# Patient Record
Sex: MALE | Race: OTHER | Employment: UNEMPLOYED | URBAN - METROPOLITAN AREA
[De-identification: names, ages, dates, MRNs, and addresses within clinical notes are randomized per-mention and may not be internally consistent; named-entity substitution may affect disease eponyms.]

---

## 2023-09-18 ENCOUNTER — HOSPITAL ENCOUNTER (EMERGENCY)
Age: 38
Discharge: HOME | End: 2023-09-18
Payer: MEDICAID

## 2023-09-18 VITALS
TEMPERATURE: 98.1 F | SYSTOLIC BLOOD PRESSURE: 116 MMHG | HEART RATE: 76 BPM | DIASTOLIC BLOOD PRESSURE: 54 MMHG | RESPIRATION RATE: 16 BRPM | OXYGEN SATURATION: 98 %

## 2023-09-18 DIAGNOSIS — M54.50 LOW BACK PAIN, UNSPECIFIED BACK PAIN LATERALITY, UNSPECIFIED CHRONICITY, UNSPECIFIED WHETHER SCIATICA PRESENT: Primary | ICD-10-CM

## 2023-09-18 DIAGNOSIS — R10.30 LOWER ABDOMINAL PAIN: ICD-10-CM

## 2023-09-18 LAB
BACTERIA: NEGATIVE /HPF
BILIRUBIN URINE: NEGATIVE MG/DL
BLOOD, URINE: NEGATIVE
CLARITY: CLEAR
COLOR: YELLOW
GLUCOSE, URINE: NEGATIVE MG/DL
INFLUENZA A ANTIGEN: NOT DETECTED
INFLUENZA B ANTIGEN: NOT DETECTED
KETONES, URINE: NEGATIVE MG/DL
LEUKOCYTE ESTERASE, URINE: ABNORMAL
MUCUS: ABNORMAL HPF
NITRITE URINE, QUANTITATIVE: NEGATIVE
PH, URINE: 7 (ref 5–8)
PROTEIN UA: NEGATIVE MG/DL
RBC URINE: 2 /HPF (ref 0–3)
SARS-COV-2 RDRP RESP QL NAA+PROBE: NOT DETECTED
SOURCE: NORMAL
SPECIFIC GRAVITY UA: 1.01 (ref 1–1.03)
SQUAMOUS EPITHELIAL: 1 /HPF
TRICHOMONAS: ABNORMAL /HPF
UROBILINOGEN, URINE: 0.2 MG/DL (ref 0.2–1)
WBC UA: 14 /HPF (ref 0–2)

## 2023-09-18 PROCEDURE — 87430 STREP A AG IA: CPT

## 2023-09-18 PROCEDURE — 87081 CULTURE SCREEN ONLY: CPT

## 2023-09-18 PROCEDURE — 81001 URINALYSIS AUTO W/SCOPE: CPT

## 2023-09-18 PROCEDURE — 87635 SARS-COV-2 COVID-19 AMP PRB: CPT

## 2023-09-18 PROCEDURE — 87502 INFLUENZA DNA AMP PROBE: CPT

## 2023-09-18 PROCEDURE — 6370000000 HC RX 637 (ALT 250 FOR IP): Performed by: PHYSICIAN ASSISTANT

## 2023-09-18 PROCEDURE — 99283 EMERGENCY DEPT VISIT LOW MDM: CPT

## 2023-09-18 RX ORDER — DICYCLOMINE HYDROCHLORIDE 10 MG/1
10 CAPSULE ORAL
Qty: 40 CAPSULE | Refills: 0 | Status: SHIPPED | OUTPATIENT
Start: 2023-09-18 | End: 2023-09-18 | Stop reason: SDUPTHER

## 2023-09-18 RX ORDER — IBUPROFEN 600 MG/1
600 TABLET ORAL EVERY 6 HOURS PRN
Qty: 20 TABLET | Refills: 0 | Status: SHIPPED | OUTPATIENT
Start: 2023-09-18 | End: 2023-09-18 | Stop reason: SDUPTHER

## 2023-09-18 RX ORDER — DICYCLOMINE HYDROCHLORIDE 10 MG/1
10 CAPSULE ORAL
Qty: 40 CAPSULE | Refills: 0 | Status: SHIPPED | OUTPATIENT
Start: 2023-09-18 | End: 2023-09-28

## 2023-09-18 RX ORDER — ACETAMINOPHEN 325 MG/1
650 TABLET ORAL ONCE
Status: COMPLETED | OUTPATIENT
Start: 2023-09-18 | End: 2023-09-18

## 2023-09-18 RX ORDER — IBUPROFEN 600 MG/1
600 TABLET ORAL EVERY 6 HOURS PRN
Qty: 20 TABLET | Refills: 0 | Status: SHIPPED | OUTPATIENT
Start: 2023-09-18 | End: 2023-10-18

## 2023-09-18 RX ORDER — IBUPROFEN 600 MG/1
600 TABLET ORAL ONCE
Status: COMPLETED | OUTPATIENT
Start: 2023-09-18 | End: 2023-09-18

## 2023-09-18 RX ADMIN — IBUPROFEN 600 MG: 600 TABLET, FILM COATED ORAL at 14:35

## 2023-09-18 RX ADMIN — ACETAMINOPHEN 650 MG: 325 TABLET ORAL at 14:35

## 2023-09-18 NOTE — ED PROVIDER NOTES
EMERGENCY DEPARTMENT ENCOUNTER        Pt Name: Mortimer Fleeting  MRN: [id number]  [address] [facility] 1985  Date of evaluation: 9/18/2023  Provider: DEEPA Hoyos  PCP: No primary care provider on file. VLAD. I have evaluated this patient. Triage CHIEF COMPLAINT       Chief Complaint   Patient presents with    Generalized Body Aches     X 1 week         HISTORY OF PRESENT ILLNESS      Chief Complaint: Body aches, low back pain, lower abdominal pain    Mortimer Fleeting is a 42 y.o. [facility] male who presents to the emergency department today for months of low back pain, lower abdominal pain, general lysed body aches. Has really nonspecific symptoms. She states that there is pains to his lower abdomen, back area. He feels just generally malaised. He denies nausea vomiting, change in his bowel habits no urinary symptoms. No fevers, no chest pain no shortness of breath no palpitations. He has no medical history no surgical history. [place] admit [place]. Nursing Notes were all reviewed and agreed with or any disagreements were addressed in the HPI. REVIEW OF SYSTEMS     At least 6 systems reviewed and otherwise acutely negative except as [facility] [address] PAST MEDICAL HISTORY   History reviewed. No pertinent past medical history. SURGICAL HISTORY   History reviewed. No pertinent surgical history.  [facility]       Discharge Medication List as of 9/18/2023  3:59 PM          ALLERGIES     Patient has no known allergies. FAMILYHISTORY     History reviewed. No pertinent family history.      SOCIAL HISTORY       Social History     Socioeconomic History    Marital status: Single     Spouse name: None    Number of children: None    Years of education: None    Highest education level: None       SCREENINGS           PHYSICAL EXAM       ED Triage Vitals [09/18/23 1317]   BP Temp Temp src Pulse Respirations SpO2 Height Weight   (!) 100/57

## 2023-09-20 LAB
CULTURE: NORMAL
Lab: NORMAL
SPECIMEN: NORMAL
STREP A DIRECT SCREEN: NEGATIVE